# Patient Record
Sex: FEMALE | Race: WHITE | NOT HISPANIC OR LATINO | Employment: UNEMPLOYED | URBAN - METROPOLITAN AREA
[De-identification: names, ages, dates, MRNs, and addresses within clinical notes are randomized per-mention and may not be internally consistent; named-entity substitution may affect disease eponyms.]

---
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
## 2023-01-06 ENCOUNTER — HOSPITAL ENCOUNTER (EMERGENCY)
Facility: HOSPITAL | Age: 18
Discharge: HOME | End: 2023-01-06
Attending: EMERGENCY MEDICINE
Payer: COMMERCIAL

## 2023-01-06 VITALS
OXYGEN SATURATION: 97 % | SYSTOLIC BLOOD PRESSURE: 129 MMHG | WEIGHT: 135 LBS | BODY MASS INDEX: 22.63 KG/M2 | RESPIRATION RATE: 16 BRPM | HEART RATE: 98 BPM | TEMPERATURE: 99 F | DIASTOLIC BLOOD PRESSURE: 87 MMHG

## 2023-01-06 DIAGNOSIS — H92.02 OTALGIA OF LEFT EAR: Primary | ICD-10-CM

## 2023-01-06 DIAGNOSIS — J06.9 VIRAL UPPER RESPIRATORY TRACT INFECTION: ICD-10-CM

## 2023-01-06 PROCEDURE — 25000003 PHARM REV CODE 250: Performed by: EMERGENCY MEDICINE

## 2023-01-06 PROCEDURE — 99283 PR EMERGENCY DEPT VISIT,LEVEL III: ICD-10-PCS | Mod: ,,, | Performed by: EMERGENCY MEDICINE

## 2023-01-06 PROCEDURE — 99283 EMERGENCY DEPT VISIT LOW MDM: CPT | Mod: ,,, | Performed by: EMERGENCY MEDICINE

## 2023-01-06 PROCEDURE — 99284 EMERGENCY DEPT VISIT MOD MDM: CPT

## 2023-01-06 RX ORDER — AZELASTINE 1 MG/ML
2 SPRAY, METERED NASAL 2 TIMES DAILY
Qty: 30 ML | Refills: 0 | Status: SHIPPED | OUTPATIENT
Start: 2023-01-06 | End: 2023-01-13

## 2023-01-06 RX ORDER — AMOXICILLIN 875 MG/1
875 TABLET, FILM COATED ORAL 2 TIMES DAILY
Qty: 10 TABLET | Refills: 0 | Status: SHIPPED | OUTPATIENT
Start: 2023-01-06 | End: 2023-01-11

## 2023-01-06 RX ORDER — IBUPROFEN 600 MG/1
600 TABLET ORAL
Status: COMPLETED | OUTPATIENT
Start: 2023-01-06 | End: 2023-01-06

## 2023-01-06 RX ADMIN — IBUPROFEN 600 MG: 600 TABLET, FILM COATED ORAL at 07:01

## 2023-01-06 NOTE — ED TRIAGE NOTES
Pt. Woke up at 0330 c ear pain.  No other s/s or complaints.  No PRNs pta    APPEARANCE: No acute distress.    NEURO: Awake, alert, appropriate for age  HEENT: Head symmetrical. No obvious deformity  RESPIRATORY: Airway is open and patent. Respirations are spontaneous on room air.   NEUROVASCULAR: All extremities are warm and pink with capillary refill less than 3 seconds.   MUSCULOSKELETAL: Moves all extremities, wiggling toes and moving hands.   SKIN: Warm and dry, adequate turgor, mucus membranes moist and pink    Will continue to monitor.

## 2023-01-06 NOTE — ED NOTES
Discharge home, states understanding to follow up as directed. Ambulates out of ED without difficulty. RX given, Med before discharge

## 2023-01-06 NOTE — DISCHARGE INSTRUCTIONS
If your ear pain continues, you should start antibiotics, amoxicillin in 2 days.  Use over-the-counter cold medication (ask the pharmacist for Sudafed or generic equivalent, whichever is cheaper - you have to show your ID).  Use ibuprofen 600 mg (3 200mg pills) every 8 hours as needed for pain.  You may also take Tylenol, 2 pills (extra strength or regular, up to 1000mg), every 6 hours as needed for pain.  Use the azelastine nasal spray as prescribed to help with the congestion.

## 2023-01-06 NOTE — ED PROVIDER NOTES
History:  Giovanni Michael is a 17 y.o. female who presents to the ED with Otalgia (L ear pain since 0330 today )    Described as previously healthy 17-year-old female presenting to the emergency department with ear pain.  She reports she has been ill with nasal congestion over the past few days.  Today around 330 she awoke with a severe sharp pain in her left ear, radiating to her left throat.  She has not tried anything for the pain.  She took Sudafed a couple days ago but has otherwise not taken any cold medicine.  She denies any fevers or chills, cough, vomiting, or diarrhea.  Of note, her brother is ill with the same symptoms.    Review of Systems: All systems reviewed and are negative except as per history of present illness.  Constitutional: Negative for fever.   HENT: + for congestion. +ear pain   Respiratory: Negative for shortness of breath.     Gastrointestinal: Negative for abdominal pain.   Genitourinary: Negative for dysuria.   Musculoskeletal: Negative for myalgias.   Skin: Negative for rash.   Neurological: Negative for focal weakness.   Psychiatric/Behavioral: Negative for memory loss.     Medications:   Discharge Medication List as of 1/6/2023  6:54 AM        CONTINUE these medications which have NOT CHANGED    Details   acyclovir (ZOVIRAX) 400 MG tablet Take 1 tablet (400 mg total) by mouth 3 (three) times daily., Starting Thu 9/28/2017, Until Thu 10/5/2017, Normal      EUCRISA 2 % Oint Starting Thu 6/25/2020, Historical Med      fluticasone (FLONASE) 50 mcg/actuation nasal spray 1 spray (50 mcg total) by Each Nare route once daily., Starting Mon 4/22/2019, Normal      HALOG 0.1 % Oint Starting Thu 5/7/2020, Historical Med      JUNEL FE 1/20, 28, 1 mg-20 mcg (21)/75 mg (7) per tablet Take 1 tablet by mouth once daily., Starting Thu 2/28/2019, Historical Med             PMH:   Past Medical History:   Diagnosis Date    Eczema      PSH:   Past Surgical History:   Procedure Laterality Date     LAPAROSCOPIC APPENDECTOMY N/A 2019    Procedure: APPENDECTOMY, LAPAROSCOPIC;  Surgeon: Miriam Harris MD;  Location: [facility] OR [address] [place] [address];  Service: Pediatrics;  Laterality: N/A;     Allergies: She has No Known Allergies.  Social History: Marital Status: single. She  reports that she has never smoked. She has never used smokeless tobacco.. She  reports no history of alcohol use..       Exam:  VITAL SIGNS:   Vitals:    23 0552   BP: 129/87   Pulse: 98   Resp: 16   Temp: 98.5 °F (36.9 °C)   TempSrc: Oral   SpO2: 97%   Weight: 61.2 kg (135 lb)     Const: Awake and alert, NAD   Head: Atraumatic  Eyes: Normal Conjunctiva  ENT: Normal External Ears, Nose and Mouth. R TM normal, Fluid behind L TM, no purulence, erythema, or bulging. Minimal erythema posterior oropharynx  Neck: Full range of motion. No meningismus. +cervical LAD  Resp: Normal respiratory effort, No distress  Cardio: Equal and intact distal pulses  Skin: No petechiae or rashes  Ext: No cyanosis, or edema  Neur: Awake and alert  Psych: Normal Mood and Affect    Medical Decision Makin-year-old female presenting to the emergency department with earache.  She has fluid behind her left TM, but no discernible signs of acute otitis media time.  I suspect this is likely viral in nature.  A 2 day wait see antibiotic prescription was given, plan to take antibiotic should her ear still hurt in 2 days.  In the meantime, ibuprofen for pain control, Sudafed for congestion, and prescribed azelastine to help clear sinuses and help with her eustachian tube dysfunction at this time.  Doubt mastoiditis, pneumonia, strep pharyngitis, acute otitis externa, deep space neck infection.  Patient declined viral swab.  Vital signs are stable, given a dose of ibuprofen, stable for discharge home.     Clinical Impression:  1. Otalgia of left ear    2. Viral upper respiratory tract infection             Medication List        START taking these medications       amoxicillin 875 MG tablet  Commonly known as: AMOXIL  Take 1 tablet (875 mg total) by mouth 2 (two) times daily. for 5 days     azelastine 137 mcg (0.1 %) nasal spray  Commonly known as: ASTELIN  2 sprays (274 mcg total) by Nasal route 2 (two) times daily. for 7 days            ASK your doctor about these medications      acyclovir 400 MG tablet  Commonly known as: ZOVIRAX  Take 1 tablet (400 mg total) by mouth 3 (three) times daily.     EUCRISA 2 % Oint  Generic drug: crisaborole     fluticasone propionate 50 mcg/actuation nasal spray  Commonly known as: FLONASE  1 spray (50 mcg total) by Each Nare route once daily.     HALOG 0.1 % Oint  Generic drug: halcinonide     JUNEL FE 1/20 (28) 1 mg-20 mcg (21)/75 mg (7) per tablet  Generic drug: norethindrone-ethinyl estradiol               Where to Get Your Medications        You can get these medications from any pharmacy    Bring a paper prescription for each of these medications  amoxicillin 875 MG tablet  azelastine 137 mcg (0.1 %) nasal spray         Follow-up Information       Rufino Galvez Iii, MD. Schedule an appointment as soon as possible for a visit in 1 week.    Specialty: Pediatrics  Why: As needed  Contact information:  [facility]  [facility] [address] [phone]                               Marizol Gonzalez MD  01/06/23 [id number]

## 2025-08-01 ENCOUNTER — OFFICE VISIT (OUTPATIENT)
Dept: OBSTETRICS AND GYNECOLOGY | Facility: CLINIC | Age: 20
End: 2025-08-01
Payer: COMMERCIAL

## 2025-08-01 VITALS
BODY MASS INDEX: 23.43 KG/M2 | SYSTOLIC BLOOD PRESSURE: 100 MMHG | DIASTOLIC BLOOD PRESSURE: 64 MMHG | HEIGHT: 65 IN | WEIGHT: 140.63 LBS

## 2025-08-01 DIAGNOSIS — Z11.3 SCREENING FOR VENEREAL DISEASE: ICD-10-CM

## 2025-08-01 DIAGNOSIS — Z30.9 ENCOUNTER FOR CONTRACEPTIVE MANAGEMENT, UNSPECIFIED TYPE: ICD-10-CM

## 2025-08-01 DIAGNOSIS — Z01.419 WELL FEMALE EXAM WITH ROUTINE GYNECOLOGICAL EXAM: Primary | ICD-10-CM

## 2025-08-01 PROBLEM — K35.30 ACUTE APPENDICITIS WITH LOCALIZED PERITONITIS, WITHOUT PERFORATION, ABSCESS, OR GANGRENE: Status: RESOLVED | Noted: 2019-09-10 | Resolved: 2025-08-01

## 2025-08-01 PROCEDURE — 88175 CYTOPATH C/V AUTO FLUID REDO: CPT | Mod: TC | Performed by: OBSTETRICS & GYNECOLOGY

## 2025-08-01 PROCEDURE — 87491 CHLMYD TRACH DNA AMP PROBE: CPT | Performed by: OBSTETRICS & GYNECOLOGY

## 2025-08-01 PROCEDURE — 99999 PR PBB SHADOW E&M-EST. PATIENT-LVL III: CPT | Mod: PBBFAC,,, | Performed by: OBSTETRICS & GYNECOLOGY

## 2025-08-01 RX ORDER — DROSPIRENONE AND ETHINYL ESTRADIOL 0.02-3(28)
1 KIT ORAL DAILY
Qty: 90 TABLET | Refills: 3 | Status: SHIPPED | OUTPATIENT
Start: 2025-08-01 | End: 2026-08-01

## 2025-08-01 RX ORDER — NORETHINDRONE ACETATE AND ETHINYL ESTRADIOL AND FERROUS FUMARATE 1MG-20(21)
1 KIT ORAL DAILY
Qty: 90 TABLET | Refills: 3 | Status: CANCELLED | OUTPATIENT
Start: 2025-08-01 | End: 2026-08-01

## 2025-08-01 NOTE — PROGRESS NOTES
SUBJECTIVE:   20 y.o. female   for routine gyn exam. Patient's last menstrual period was 2025 (approximate)..  She has no unusual complaints. Desires OCP's for cycle control. On weight loss injections.         Past Medical History:   Diagnosis Date    Eczema      Past Surgical History:   Procedure Laterality Date    LAPAROSCOPIC APPENDECTOMY N/A 2019    Procedure: APPENDECTOMY, LAPAROSCOPIC;  Surgeon: Miriam Harris MD;  Location: [facility] OR [address] [place] [address];  Service: Pediatrics;  Laterality: N/A;     Social History[1]  Family History   Problem Relation Name Age of Onset    ADD / ADHD Father ngozi     Mental illness Father ngozi         panic attack    Allergies Brother livier         dairy     ADD / ADHD Brother livier     Alzheimer's disease Maternal Grandmother      Cancer Maternal Grandfather      Heart disease Paternal Grandfather       OB History    Para Term  AB Living   0 0 0 0 0 0   SAB IAB Ectopic Multiple Live Births   0 0 0 0 0         Current Medications[2]  Allergies: Patient has no known allergies.     ROS:  Constitutional: no weight loss, weight gain, fever, fatigue  Eyes:  No vision changes, glasses/contacts  ENT/Mouth: No ulcers, sinus problems, ears ringing, headache  Cardiovascular: No inability to lie flat, chest pain, exercise intolerance, swelling, heart palpitations  Respiratory: No wheezing, coughing blood, shortness of breath, or cough  Gastrointestinal: No diarrhea, bloody stool, nausea/vomiting, constipation, gas, hemorrhoids  Genitourinary: No blood in urine, painful urination, urgency of urination, frequency of urination, incomplete emptying, incontinence, abnormal bleeding, painful periods, heavy periods, vaginal discharge, vaginal odor, painful intercourse, sexual problems, bleeding after intercourse.  Musculoskeletal: No muscle weakness  Skin/Breast: No painful breasts, nipple discharge, masses, rash, ulcers  Neurological: No passing out, seizures, numbness,  "headache  Endocrine: No diabetes, hypothyroid, hyperthyroid, hot flashes, hair loss, abnormal hair growth, acne  Psychiatric: No depression, crying  Hematologic: No bruises, bleeding, swollen lymph nodes, anemia.      OBJECTIVE:   The patient appears well, alert, oriented x 3, in no distress.  /64 (BP Location: Left arm, Patient Position: Sitting)   Ht 5' 5" (1.651 m)   Wt 63.8 kg (140 lb 10.5 oz)   LMP 07/11/2025 (Approximate)   BMI 23.41 kg/m²   NECK: no thyromegaly, trachea midline  SKIN: no acne, striae, hirsutism  CHEST: CTAB  CV: RRR  BREAST EXAM: breasts appear normal, no suspicious masses, no skin or nipple changes or axillary nodes  ABDOMEN: no hernias, masses, or hepatosplenomegaly  GENITALIA: normal external genitalia, no erythema, no discharge  URETHRA: normal urethra, normal urethral meatus  VAGINA: Normal  CERVIX: no lesions or cervical motion tenderness  UTERUS: normal size, contour, position, consistency, mobility, non-tender  ADNEXA: no mass, fullness, tenderness      ASSESSMENT:   1. Well female exam with routine gynecological exam  Liquid-Based Pap Smear, Screening      2. Encounter for contraceptive management, unspecified type  POCT urine pregnancy      3. Screening for venereal disease  C. trachomatis/N. gonorrhoeae by AMP DNA          PLAN:   Orders Placed This Encounter    C. trachomatis/N. gonorrhoeae by AMP DNA    POCT urine pregnancy    Liquid-Based Pap Smear, Screening    drospirenone-ethinyl estradioL (TESHA) 3-0.02 mg per tablet     Discussed GLP1 and absorption of OCPs, alternatives.  Discussed healthy lifestyle including regular exercise, healthy eating, etc.  Return to clinic in 1 year         [1]   Social History  Socioeconomic History    Marital status: Single   Tobacco Use    Smoking status: Never     Passive exposure: Never    Smokeless tobacco: Never   Substance and Sexual Activity    Alcohol use: Yes     Comment: social    Drug use: No    Sexual activity: Not Currently "     Partners: Male     Birth control/protection: OCP   Social History Narrative    Lives with parents and sib, 1 dog.   [2]   Current Outpatient Medications   Medication Sig Dispense Refill    drospirenone-ethinyl estradioL (TESHA) 3-0.02 mg per tablet Take 1 tablet by mouth once daily. 90 tablet 3     No current facility-administered medications for this visit.

## 2025-08-05 ENCOUNTER — TELEPHONE (OUTPATIENT)
Dept: OBSTETRICS AND GYNECOLOGY | Facility: CLINIC | Age: 20
End: 2025-08-05
Payer: COMMERCIAL

## 2025-08-05 NOTE — TELEPHONE ENCOUNTER
Patient inquire when to start Oral contraception- advised to start Sunday at bed time and after a meal.

## 2025-08-05 NOTE — TELEPHONE ENCOUNTER
----- Message from Ernestine sent at 8/5/2025  2:38 PM CDT -----  Pt called to speak with the provider or nurse in regards to when she can start taking her bc ( Taniya). Pls call pt at [phone].

## 2025-08-07 LAB
INSULIN SERPL-ACNC: NORMAL U[IU]/ML
LAB AP BETHESDA CATEGORY: NORMAL
LAB AP CLINICAL FINDINGS: NORMAL
LAB AP CONTRACEPTIVES: NORMAL
LAB AP GYN ADDITIONAL FINDINGS: NORMAL
LAB AP OCHS PAP SPECIMEN ADEQUACY: NORMAL
LAB AP OHS PAP INTERPRETATION: NORMAL
LAB AP PAP DISCLAIMER COMMENTS: NORMAL
LAB AP PAP ESTROGEN REPLACEMENT THERAPY: NORMAL
LAB AP PAP PMP: NORMAL
LAB AP PAP PREVIOUS BX: NORMAL
LAB AP PAP PRIOR TREATMENT: NORMAL
LAB AP PERFORMING LOCATION(S): NORMAL

## 2025-08-08 LAB
C TRACH DNA SPEC QL NAA+PROBE: NOT DETECTED
CTGC SOURCE (OHS) ORD-325: NORMAL
N GONORRHOEA DNA UR QL NAA+PROBE: NOT DETECTED

## (undated) DEVICE — CART STAPLE RELD 45MM WHT

## (undated) DEVICE — SUT PLAIN 5-0 FS2 27IN YEL

## (undated) DEVICE — SEE MEDLINE ITEM 152622

## (undated) DEVICE — KIT ANTIFOG

## (undated) DEVICE — SYR 10CC LUER LOCK

## (undated) DEVICE — STAPLER INT LINEAR ARTC 3.5-45

## (undated) DEVICE — SCISSOR 5MMX35CM DIRECT DRIVE

## (undated) DEVICE — CLOSURE SKIN STERI STRIP 1/2X4

## (undated) DEVICE — TUBING HF INSUFFLATION W/ FLTR

## (undated) DEVICE — GOWN SURGICAL X-LARGE

## (undated) DEVICE — GLOVE PI ULTRA TOUCH G SURGEON

## (undated) DEVICE — BLADE SURG STAINLESS STEEL #15

## (undated) DEVICE — BLADE ELECTRO EDGE INSULATED

## (undated) DEVICE — TRAY FOLEY 16FR INFECTION CONT

## (undated) DEVICE — TROCAR ENDOPATH XCEL 12X100MM

## (undated) DEVICE — ADHESIVE MASTISOL VIAL 48/BX

## (undated) DEVICE — DRAPE STERI-DRAPE 1000 17X11IN

## (undated) DEVICE — SUT MONOCRYL 5-0 P-3 UND 18